# Patient Record
(demographics unavailable — no encounter records)

---

## 2025-04-30 NOTE — ASSESSMENT
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 Nikita Nunez - carboplatin/keytruda/pemetrexed/xgeva for bony mets), Emphysema/ bronchiectasis, and she is referred to me by Dr. Nunez to establish care w/ pulmonary for her emphysema.  - PFT w/ Bronchodilator challenge - Hold off on inhalers for now given no significant limitation in her breathing. - If needed, will start LABA/LAMA - Obtained and reviewed records from Dr. Nikita Nunez oncology (in chart) - May be a candidate for RT to spine, pt is unsure if she is supposed to be getting that - RTC in 6 weeks  I personally reviewed all patient's imaging with the patient in-person in detail, showing the images and pointing out all relevant normal and abnormal findings if any were present, and answered all of the patient's questions.

## 2025-04-30 NOTE — HISTORY OF PRESENT ILLNESS
[Former] : former [>= 20 pack years] : >= 20 pack years [Never] : never [TextBox_4] : 69 F w/ long history of anorexia (currently 5'1, 68 lbs), recent diagnosis of RUL stage 4 lung acinar adenocarcinoma with mets to vertebral bodies (most notably T12) earlier this month and started on chemo (Dr. Nikita Nunez - carboplatin/keytruda/pemetrexed/xgeva for bony mets), Emphysema/ bronchiectasis, and she is referred to me by Dr. Nunez to establish care w/ pulmonary for her emphysema.  She is present today with her son, and her daughter Kerline was present over the phone.  She reports no significant dyspnea on exertion. She is mostly at home with her  and walks up and down the stairs daily and needs to hold on to the railing but has no difficulty with breathing. She doesn't go out grocery shopping much but thinks she could be able to do it. She recently used a friend's albuterol inhaler with some relief when she had some discomfort with her breathing.  She is a former 1 PPD smoker since age 16. Has now quit.  CT chest 3/21/25 at [facility] showed emphysema, bilateral apical sacrring, bibasilar bronchiectasis with mucoid impaction, R > L, and spiculated RUL mass 4.4 cm and scattered solid/ground glass nodules.  PET-CT at [facility] (she showed me the report on her iPad) showed PET avidity in the RUL mass, mediastinal lymph nodes, and in the spine T12 spine most notable. s/p CT guided biopsy which confirmed the diagnosis.   [TextBox_11] : 1 [TextBox_13] : 60 [YearQuit] : 2025

## 2025-05-29 NOTE — HISTORY OF PRESENT ILLNESS
[Former] : former [>= 20 pack years] : >= 20 pack years [Never] : never [TextBox_4] : 69 F w/ long history of anorexia (currently 5'1, 68 lbs), recent diagnosis of RUL stage 4 lung acinar adenocarcinoma with mets to vertebral bodies (most notably T12) and started on chemo (Dr. Nikita Nunez - carboplatin/keytruda/pemetrexed/xgeva for bony mets), Emphysema/ bronchiectasis, presents for follow up of emphysema.  PFTs today show:  FEV1 1.60 L 89% FVC 2.22 L 97% ratio 0.72  TLC 4.8 L 113% RV 0.83 L 140% RV/TLC 51  Breathing has been well. Recently lost her car due to financial issues. No significant cough. Getting another round of chemo next week then getting repeat PET-CT.  DLCO 8.09 48% [TextBox_11] : 1 [TextBox_13] : 60 [YearQuit] : 2025

## 2025-05-29 NOTE — ASSESSMENT
[FreeTextEntry1] : 69 F w/ long history of anorexia (currently 5'1, 68 lbs), recent diagnosis of RUL stage 4 lung acinar adenocarcinoma with mets to vertebral bodies (most notably T12) and started on chemo (Dr. Nikita Nunez - carboplatin/keytruda/pemetrexed/xgeva for bony mets), Emphysema/ bronchiectasis, presents for follow up of emphysema.  - PFTs show mild obstructive physiology and moderately impaired diffusion capacity likely due to COPD - Start trial of Anoro inhaler. Discussed w/ patient that if it is not covered by insurance, pt is to ask pharmacy or insurance on which one is on their formulary and notify office staff to have it switched to the insurance's preferred inhaler. Pt agrees to this plan. - Discussed and demonstrated appropriate inhaler technique for adequate medication delivery to lungs - F/U with oncology, Dr. Nunez - RTC in 6 months  I personally reviewed patient's pulmonary function testing with the patient in-person in detail, showing the flow volume loops and any normal and abnormal findings if present, and answered all of the patient's questions.